# Patient Record
Sex: FEMALE | Race: WHITE
[De-identification: names, ages, dates, MRNs, and addresses within clinical notes are randomized per-mention and may not be internally consistent; named-entity substitution may affect disease eponyms.]

---

## 2017-01-24 ENCOUNTER — APPOINTMENT (OUTPATIENT)
Dept: NEUROLOGY | Facility: CLINIC | Age: 82
End: 2017-01-24

## 2017-01-24 VITALS — SYSTOLIC BLOOD PRESSURE: 114 MMHG | DIASTOLIC BLOOD PRESSURE: 70 MMHG | HEIGHT: 64 IN

## 2017-01-24 VITALS — WEIGHT: 108 LBS | BODY MASS INDEX: 18.54 KG/M2

## 2017-01-24 DIAGNOSIS — M35.9 SYSTEMIC INVOLVEMENT OF CONNECTIVE TISSUE, UNSPECIFIED: ICD-10-CM

## 2017-01-24 RX ORDER — SIMVASTATIN 10 MG/1
10 TABLET, FILM COATED ORAL
Refills: 0 | Status: ACTIVE | COMMUNITY

## 2017-04-25 ENCOUNTER — APPOINTMENT (OUTPATIENT)
Dept: NEUROLOGY | Facility: CLINIC | Age: 82
End: 2017-04-25

## 2017-04-25 DIAGNOSIS — F03.90 UNSPECIFIED DEMENTIA W/OUT BEHAVIORAL DISTURBANCE: ICD-10-CM

## 2017-04-25 RX ORDER — HYDROXYCHLOROQUINE SULFATE 200 MG/1
200 TABLET, FILM COATED ORAL
Qty: 60 | Refills: 0 | Status: ACTIVE | COMMUNITY
Start: 2017-03-28

## 2017-04-25 RX ORDER — PREDNISONE 5 MG/1
5 TABLET ORAL
Qty: 60 | Refills: 0 | Status: COMPLETED | COMMUNITY
Start: 2017-03-20

## 2017-04-25 RX ORDER — FLUOCINOLONE ACETONIDE 0.11 MG/ML
0.01 OIL TOPICAL
Qty: 118 | Refills: 0 | Status: ACTIVE | COMMUNITY
Start: 2017-02-28

## 2017-06-19 ENCOUNTER — APPOINTMENT (OUTPATIENT)
Dept: NEUROLOGY | Facility: CLINIC | Age: 82
End: 2017-06-19

## 2017-06-19 VITALS — SYSTOLIC BLOOD PRESSURE: 128 MMHG | WEIGHT: 101 LBS | DIASTOLIC BLOOD PRESSURE: 76 MMHG | BODY MASS INDEX: 17.34 KG/M2

## 2017-06-19 DIAGNOSIS — F03.90 UNSPECIFIED DEMENTIA W/OUT BEHAVIORAL DISTURBANCE: ICD-10-CM

## 2018-04-02 ENCOUNTER — INPATIENT (INPATIENT)
Facility: HOSPITAL | Age: 83
LOS: 0 days | Discharge: HOME | End: 2018-04-02
Attending: INTERNAL MEDICINE | Admitting: INTERNAL MEDICINE
Payer: MEDICARE

## 2018-04-02 VITALS
HEART RATE: 90 BPM | OXYGEN SATURATION: 100 % | RESPIRATION RATE: 18 BRPM | SYSTOLIC BLOOD PRESSURE: 169 MMHG | DIASTOLIC BLOOD PRESSURE: 87 MMHG | TEMPERATURE: 98 F

## 2018-04-02 VITALS
DIASTOLIC BLOOD PRESSURE: 76 MMHG | WEIGHT: 110.01 LBS | HEART RATE: 98 BPM | TEMPERATURE: 98 F | RESPIRATION RATE: 16 BRPM | SYSTOLIC BLOOD PRESSURE: 170 MMHG | OXYGEN SATURATION: 96 %

## 2018-04-02 DIAGNOSIS — R07.82 INTERCOSTAL PAIN: ICD-10-CM

## 2018-04-02 DIAGNOSIS — E78.2 MIXED HYPERLIPIDEMIA: ICD-10-CM

## 2018-04-02 DIAGNOSIS — I10 ESSENTIAL (PRIMARY) HYPERTENSION: ICD-10-CM

## 2018-04-02 LAB
ADD ON TEST-SPECIMEN IN LAB: SIGNIFICANT CHANGE UP
ALBUMIN SERPL ELPH-MCNC: 3.3 G/DL — SIGNIFICANT CHANGE UP (ref 3.3–5)
ALP SERPL-CCNC: 70 U/L — SIGNIFICANT CHANGE UP (ref 40–120)
ALT FLD-CCNC: 11 U/L — LOW (ref 12–78)
ANION GAP SERPL CALC-SCNC: 10 MMOL/L — SIGNIFICANT CHANGE UP (ref 5–17)
APTT BLD: 29.6 SEC — SIGNIFICANT CHANGE UP (ref 27.5–37.4)
AST SERPL-CCNC: 21 U/L — SIGNIFICANT CHANGE UP (ref 15–37)
BASOPHILS # BLD AUTO: 0.15 K/UL — SIGNIFICANT CHANGE UP (ref 0–0.2)
BASOPHILS NFR BLD AUTO: 2 % — SIGNIFICANT CHANGE UP (ref 0–2)
BILIRUB SERPL-MCNC: 0.6 MG/DL — SIGNIFICANT CHANGE UP (ref 0.2–1.2)
BUN SERPL-MCNC: 15 MG/DL — SIGNIFICANT CHANGE UP (ref 7–23)
CALCIUM SERPL-MCNC: 9.3 MG/DL — SIGNIFICANT CHANGE UP (ref 8.5–10.1)
CHLORIDE SERPL-SCNC: 105 MMOL/L — SIGNIFICANT CHANGE UP (ref 96–108)
CO2 SERPL-SCNC: 23 MMOL/L — SIGNIFICANT CHANGE UP (ref 22–31)
CREAT SERPL-MCNC: 0.69 MG/DL — SIGNIFICANT CHANGE UP (ref 0.5–1.3)
D DIMER BLD IA.RAPID-MCNC: 161 NG/ML DDU — SIGNIFICANT CHANGE UP
EOSINOPHIL # BLD AUTO: 0.11 K/UL — SIGNIFICANT CHANGE UP (ref 0–0.5)
EOSINOPHIL NFR BLD AUTO: 1.5 % — SIGNIFICANT CHANGE UP (ref 0–6)
GLUCOSE SERPL-MCNC: 67 MG/DL — LOW (ref 70–99)
HCT VFR BLD CALC: 35.9 % — SIGNIFICANT CHANGE UP (ref 34.5–45)
HGB BLD-MCNC: 12.4 G/DL — SIGNIFICANT CHANGE UP (ref 11.5–15.5)
IMM GRANULOCYTES NFR BLD AUTO: 0.1 % — SIGNIFICANT CHANGE UP (ref 0–1.5)
INR BLD: 1.08 RATIO — SIGNIFICANT CHANGE UP (ref 0.88–1.16)
LYMPHOCYTES # BLD AUTO: 1.55 K/UL — SIGNIFICANT CHANGE UP (ref 1–3.3)
LYMPHOCYTES # BLD AUTO: 20.8 % — SIGNIFICANT CHANGE UP (ref 13–44)
MCHC RBC-ENTMCNC: 29.6 PG — SIGNIFICANT CHANGE UP (ref 27–34)
MCHC RBC-ENTMCNC: 34.5 GM/DL — SIGNIFICANT CHANGE UP (ref 32–36)
MCV RBC AUTO: 85.7 FL — SIGNIFICANT CHANGE UP (ref 80–100)
MONOCYTES # BLD AUTO: 1.17 K/UL — HIGH (ref 0–0.9)
MONOCYTES NFR BLD AUTO: 15.7 % — HIGH (ref 2–14)
NEUTROPHILS # BLD AUTO: 4.46 K/UL — SIGNIFICANT CHANGE UP (ref 1.8–7.4)
NEUTROPHILS NFR BLD AUTO: 59.9 % — SIGNIFICANT CHANGE UP (ref 43–77)
NRBC # BLD: 0 /100 WBCS — SIGNIFICANT CHANGE UP (ref 0–0)
NT-PROBNP SERPL-SCNC: 4518 PG/ML — HIGH (ref 0–450)
PLATELET # BLD AUTO: 370 K/UL — SIGNIFICANT CHANGE UP (ref 150–400)
POTASSIUM SERPL-MCNC: 3.9 MMOL/L — SIGNIFICANT CHANGE UP (ref 3.5–5.3)
POTASSIUM SERPL-SCNC: 3.9 MMOL/L — SIGNIFICANT CHANGE UP (ref 3.5–5.3)
PROT SERPL-MCNC: 7 GM/DL — SIGNIFICANT CHANGE UP (ref 6–8.3)
PROTHROM AB SERPL-ACNC: 11.7 SEC — SIGNIFICANT CHANGE UP (ref 9.8–12.7)
RBC # BLD: 4.19 M/UL — SIGNIFICANT CHANGE UP (ref 3.8–5.2)
RBC # FLD: 13.1 % — SIGNIFICANT CHANGE UP (ref 10.3–14.5)
SODIUM SERPL-SCNC: 138 MMOL/L — SIGNIFICANT CHANGE UP (ref 135–145)
TROPONIN I SERPL-MCNC: <0.015 NG/ML — SIGNIFICANT CHANGE UP (ref 0.01–0.04)
TROPONIN I SERPL-MCNC: <0.015 NG/ML — SIGNIFICANT CHANGE UP (ref 0.01–0.04)
WBC # BLD: 7.45 K/UL — SIGNIFICANT CHANGE UP (ref 3.8–10.5)
WBC # FLD AUTO: 7.45 K/UL — SIGNIFICANT CHANGE UP (ref 3.8–10.5)

## 2018-04-02 PROCEDURE — 71045 X-RAY EXAM CHEST 1 VIEW: CPT | Mod: 26

## 2018-04-02 PROCEDURE — 93010 ELECTROCARDIOGRAM REPORT: CPT

## 2018-04-02 PROCEDURE — 99285 EMERGENCY DEPT VISIT HI MDM: CPT

## 2018-04-02 RX ORDER — ASPIRIN/CALCIUM CARB/MAGNESIUM 324 MG
325 TABLET ORAL ONCE
Qty: 0 | Refills: 0 | Status: COMPLETED | OUTPATIENT
Start: 2018-04-02 | End: 2018-04-02

## 2018-04-02 RX ORDER — SODIUM CHLORIDE 9 MG/ML
3 INJECTION INTRAMUSCULAR; INTRAVENOUS; SUBCUTANEOUS ONCE
Qty: 0 | Refills: 0 | Status: COMPLETED | OUTPATIENT
Start: 2018-04-02 | End: 2018-04-02

## 2018-04-02 RX ORDER — NITROGLYCERIN 6.5 MG
0.4 CAPSULE, EXTENDED RELEASE ORAL
Qty: 0 | Refills: 0 | Status: DISCONTINUED | OUTPATIENT
Start: 2018-04-02 | End: 2018-04-02

## 2018-04-02 RX ORDER — ACETAMINOPHEN 500 MG
650 TABLET ORAL EVERY 6 HOURS
Qty: 0 | Refills: 0 | Status: DISCONTINUED | OUTPATIENT
Start: 2018-04-02 | End: 2018-04-02

## 2018-04-02 RX ORDER — HYDROXYCHLOROQUINE SULFATE 200 MG
1 TABLET ORAL
Qty: 0 | Refills: 0 | COMMUNITY

## 2018-04-02 RX ORDER — ONDANSETRON 8 MG/1
4 TABLET, FILM COATED ORAL EVERY 6 HOURS
Qty: 0 | Refills: 0 | Status: DISCONTINUED | OUTPATIENT
Start: 2018-04-02 | End: 2018-04-02

## 2018-04-02 RX ORDER — ASPIRIN/CALCIUM CARB/MAGNESIUM 324 MG
325 TABLET ORAL DAILY
Qty: 0 | Refills: 0 | Status: DISCONTINUED | OUTPATIENT
Start: 2018-04-02 | End: 2018-04-02

## 2018-04-02 RX ORDER — HYDROXYCHLOROQUINE SULFATE 200 MG
200 TABLET ORAL DAILY
Qty: 0 | Refills: 0 | Status: DISCONTINUED | OUTPATIENT
Start: 2018-04-02 | End: 2018-04-02

## 2018-04-02 RX ADMIN — Medication 325 MILLIGRAM(S): at 13:54

## 2018-04-02 RX ADMIN — SODIUM CHLORIDE 3 MILLILITER(S): 9 INJECTION INTRAMUSCULAR; INTRAVENOUS; SUBCUTANEOUS at 11:37

## 2018-04-02 NOTE — ED ADULT NURSE REASSESSMENT NOTE - NS ED NURSE REASSESS COMMENT FT1
pt son refused straight cath for urine, pt states she does not have to urinate, pt discharged in no acute distress

## 2018-04-02 NOTE — ED ADULT NURSE NOTE - OBJECTIVE STATEMENT
pt presents to ED from home, pt alert and orientedx2, VSS afebrile, as per EMS patients  states that she had an epsiode of chest pain this morning. pt denies pain at this time, denies SOB, pt resting comfortably.  states pt has been fatigued altely as well, safety maintained will continue to monitor

## 2018-04-02 NOTE — CONSULT NOTE ADULT - PROBLEM SELECTOR RECOMMENDATION 9
- not typical of angina  - likely musculoskeletal in nature  - d/c home for outpatient ischemic workup

## 2018-04-02 NOTE — CONSULT NOTE ADULT - SUBJECTIVE AND OBJECTIVE BOX
86 F with Hx of HTN, Hyperlipidemia (not on any meds) presents with the sudden onset of chest pain while at rest while laying next to her .  Patient is unable to give any detailed information.  Information obtained from discussion with the  and son (Benny) at the bedside.  The chest pain was in the left chest and lasted just a few seconds and went away on its own.  No prior Hx of chest pain symptoms.  No exertional chest pain or shortness of breath.  No fall, no trauma and no recent heavy lifting.  No radiation of the pain to the left arm, jaw, or back and no associated diaphoresis, nausea, vomiting, or abdominal pain.  No associated pleuritic chest pain.      In the ED, the patient was treated with ASA.  First troponin was negative.  EKG did not show any acute ischemic changes.  No recurrent pain in the ED.  Tele in the ED showed no acute arrythmias.    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       VITALS:  T(F): 97.8 (04-02-18 @ 11:02), Max: 97.8 (04-02-18 @ 11:02)  HR: 98 (04-02-18 @ 11:02) (98 - 98)  BP: 170/76 (04-02-18 @ 11:02) (170/76 - 170/76)  RR: 16 (04-02-18 @ 11:02) (16 - 16)  SpO2: 96% (04-02-18 @ 11:02) (96% - 96%)   PHYSICAL EXAM:   HEENT:  pupils equal and reactive, EOMI, no oropharyngeal lesions, erythema, exudates, oral thrush   NECK:   supple, no carotid bruits, no palpable lymph nodes, no thyromegaly   CV:  +S1, +S2, regular, no murmurs or rubs   RESP:   lungs clear to auscultation bilaterally, no wheezing, rales, rhonchi, good air entry bilaterally   BREAST:  not examined   GI:  abdomen soft, non-tender, non-distended, normal BS, no bruits, no abdominal masses, no palpable masses   RECTAL:  not examined   :  not examined   MSK:   normal muscle tone, no atrophy, no rigidity, no contractions   EXT:   no clubbing, no cyanosis, no edema, no calf pain, swelling or erythema   VASCULAR:  pulses equal and symmetric in the upper and lower extremities   NEURO:  AAOX3, no focal neurological deficits, follows all commands, able to move extremities spontaneously   SKIN:  no ulcers, lesions or rashes	  	       Labs and Results:  Labs, Radiology, Cardiology, and Other Results:               12.4   7.45  )-----------( 370      ( 02 Apr 2018 11:16 )             35.9    04-02   138  |  105  |  15  ----------------------------<  67<L>  3.9   |  23  |  0.69   Ca    9.3      02 Apr 2018 11:16   TPro  7.0  /  Alb  3.3  /  TBili  0.6  /  DBili  x   /  AST  21  /  ALT  11<L>  /  AlkPhos  70  04-02   CARDIAC MARKERS ( 02 Apr 2018 14:14 )  <0.015 ng/mL / x     / x     / x     / x      CARDIAC MARKERS ( 02 Apr 2018 11:16 )  <0.015 ng/mL / x     / x     / x     / x       LIVER FUNCTIONS - ( 02 Apr 2018 11:16 )  Alb: 3.3 g/dL / Pro: 7.0 gm/dL / ALK PHOS: 70 U/L / ALT: 11 U/L / AST: 21 U/L / GGT: x          PT/INR - ( 02 Apr 2018 11:16 )   PT: 11.7 sec;   INR: 1.08 ratio     PTT - ( 02 Apr 2018 11:16 )  PTT:29.6 sec   D-DIMER =  negative   BNP - 4518   EKG - NSR, LVH with repolarization abnormalities, LAD, no acute ischemic changes, poor RW progression concerning for old anterior wall MI, no changes compared to prior EKGs   CXR -   EXAM:  XR CHEST AP OR PA 1V                         PROCEDURE DATE:  04/02/2018     INTERPRETATION:  XR CHEST AP OR PA 1V   Single AP view   HISTORY:  Chest Pain   Comparison:  none.   The cardiac silhouette is within normal limits. The lungs are clear. No   pleural abnormality.   IMPRESSION: No acute disease.	  	      IMPRESSION:    ATYPICAL CHEST PAIN IN PATIENT WITH RISK FACTORS FOR CAD.  CHEST PAIN WAS SELF-LIMITED AND NO OBJECTIVE EVIDENCE AT THIS TIME FOR ACTIVE CARDIAC ISCHEMIA    HYPERTENSION - ON NO MEDS AT HOME    ELEVATED BNP WITHOUT ACTIVE EVIDENCE FOR CONGESTIVE HEAT FAILURE      RECOMMENDATIONS:  -  serial cardiac enzymes, R/O MI  -  check fasting lipid panel  -  start ASA  -  PRN NTG q5min  -  continue Plaquenil  -  DVT prophylaxis - venodynes  -  Cardio consult - Dr. Nobles  -  likely d/c home from ED if enzymes are negative with outpatient follow up with cardio for stress test and ECHO    d/w patient,  and son Benny at the bedside and d/w ED MD Dr. Payne 86 F with Hx of HTN, Hyperlipidemia (not on any meds) presents with the sudden onset of chest pain while at rest while laying next to her .  Patient is unable to give any detailed information.  Information obtained from discussion with the  and son (Benny) at the bedside.  The chest pain was in the left chest and lasted just a few seconds and went away on its own.  No prior Hx of chest pain symptoms.  No exertional chest pain or shortness of breath.  No fall, no trauma and no recent heavy lifting.  No radiation of the pain to the left arm, jaw, or back and no associated diaphoresis, nausea, vomiting, or abdominal pain.  No associated pleuritic chest pain.      In the ED, the patient was treated with ASA.  First troponin was negative.  EKG did not show any acute ischemic changes.  No recurrent pain in the ED.  Tele in the ED showed no acute arrythmias.    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       VITALS:  T(F): 97.8 (04-02-18 @ 11:02), Max: 97.8 (04-02-18 @ 11:02)  HR: 98 (04-02-18 @ 11:02) (98 - 98)  BP: 170/76 (04-02-18 @ 11:02) (170/76 - 170/76)  RR: 16 (04-02-18 @ 11:02) (16 - 16)  SpO2: 96% (04-02-18 @ 11:02) (96% - 96%)   PHYSICAL EXAM:   HEENT:  pupils equal and reactive, EOMI, no oropharyngeal lesions, erythema, exudates, oral thrush   NECK:   supple, no carotid bruits, no palpable lymph nodes, no thyromegaly   CV:  +S1, +S2, regular, no murmurs or rubs   RESP:   lungs clear to auscultation bilaterally, no wheezing, rales, rhonchi, good air entry bilaterally   BREAST:  not examined   GI:  abdomen soft, non-tender, non-distended, normal BS, no bruits, no abdominal masses, no palpable masses   RECTAL:  not examined   :  not examined   MSK:   normal muscle tone, no atrophy, no rigidity, no contractions   EXT:   no clubbing, no cyanosis, no edema, no calf pain, swelling or erythema   VASCULAR:  pulses equal and symmetric in the upper and lower extremities   NEURO:  AAOX3, no focal neurological deficits, follows all commands, able to move extremities spontaneously   SKIN:  no ulcers, lesions or rashes	  	       Labs and Results:  Labs, Radiology, Cardiology, and Other Results:               12.4   7.45  )-----------( 370      ( 02 Apr 2018 11:16 )             35.9    04-02   138  |  105  |  15  ----------------------------<  67<L>  3.9   |  23  |  0.69   Ca    9.3      02 Apr 2018 11:16   TPro  7.0  /  Alb  3.3  /  TBili  0.6  /  DBili  x   /  AST  21  /  ALT  11<L>  /  AlkPhos  70  04-02   CARDIAC MARKERS ( 02 Apr 2018 14:14 )  <0.015 ng/mL / x     / x     / x     / x      CARDIAC MARKERS ( 02 Apr 2018 11:16 )  <0.015 ng/mL / x     / x     / x     / x       LIVER FUNCTIONS - ( 02 Apr 2018 11:16 )  Alb: 3.3 g/dL / Pro: 7.0 gm/dL / ALK PHOS: 70 U/L / ALT: 11 U/L / AST: 21 U/L / GGT: x          PT/INR - ( 02 Apr 2018 11:16 )   PT: 11.7 sec;   INR: 1.08 ratio     PTT - ( 02 Apr 2018 11:16 )  PTT:29.6 sec   D-DIMER =  negative   BNP - 4518   EKG - NSR, LVH with repolarization abnormalities, LAD, no acute ischemic changes, poor RW progression concerning for old anterior wall MI, no changes compared to prior EKGs   CXR -   EXAM:  XR CHEST AP OR PA 1V                         PROCEDURE DATE:  04/02/2018     INTERPRETATION:  XR CHEST AP OR PA 1V   Single AP view   HISTORY:  Chest Pain   Comparison:  none.   The cardiac silhouette is within normal limits. The lungs are clear. No   pleural abnormality.   IMPRESSION: No acute disease.	  	      IMPRESSION:    ATYPICAL CHEST PAIN IN PATIENT WITH RISK FACTORS FOR CAD.  CHEST PAIN WAS SELF-LIMITED AND NO OBJECTIVE EVIDENCE AT THIS TIME FOR ACTIVE CARDIAC ISCHEMIA.    HYPERTENSION - ON NO MEDS AT HOME    ELEVATED BNP WITHOUT ACTIVE EVIDENCE FOR CONGESTIVE HEAT FAILURE      RECOMMENDATIONS:  -  serial cardiac enzymes, R/O MI  -  check fasting lipid panel  -  start ASA  -  PRN NTG q5min  -  continue Plaquenil  -  DVT prophylaxis - venodynes  -  Cardio consult - Dr. Nobles  -  likely d/c home from ED if enzymes are negative with outpatient follow up with cardio for stress test and ECHO    d/w patient,  and son Benny at the bedside and d/w ED MD Dr. Payne

## 2018-04-02 NOTE — ED PROVIDER NOTE - CONSTITUTIONAL, MLM
normal... Well appearing, well nourished, awake, alert, and in no apparent distress. Well appearing, well nourished, awake, alert and in no apparent distress.

## 2018-04-02 NOTE — CONSULT NOTE ADULT - ASSESSMENT
Chest pain- atypical in nature- She underwent cardiac PET 1/2017 which did not reveal any ischemia.  Her LVEF was noted to be normal.  Pt denies any more symptoms.  Chest pain is atypical in nature. So far cardiac enzymes and EKG did not reveal any ischemia.   Outpt ischemic heart disease evaluation recommended.  f/u with cardiologist as outpt.   D/W Dr Marie and the family.   ASA 81mg po daily.   Consider statin but will defer this to Dr Nobles her primary  cardiologist.     Lupus- Management pre primary team.   Other medical issues- Management per primary team.   Thank you for allowing me to participate in the care of this patient. Please feel free to contact me with any questions.

## 2018-04-02 NOTE — ED PROVIDER NOTE - PROGRESS NOTE DETAILS
[facility] PGY4: ingrid Zapata Record [phone] Attending Kerry: Pt seen by cardiology consultm, rec since 2nd troponin negative and atypical CP, pt stable for DC home. Family would not like intervention even if recommended. Medical management discussed by cardiologist Attending Kerry: Pt seen by cardiology consult, rec since 2nd troponin negative and atypical CP, pt stable for DC home. Family would not like intervention even if recommended. Medical management discussed by cardiologist

## 2018-04-02 NOTE — ED PROVIDER NOTE - CARDIAC, MLM
Normal rate, regular rhythm.  Heart sounds S1, S2.  No murmurs, rubs or gallops. Normal rate, regular rhythm. Systolic murmur.

## 2018-04-02 NOTE — ED PROVIDER NOTE - OBJECTIVE STATEMENT
87 y/o F with a PMHx of HTN, dementia, HLD presents to the ED regarding an episode of acute onset CP today. At 10:00am today, pt had acute onset, sharp, severe CP that lasted a few seconds. Pain is non-radiating and localized to the chest. During episode, pt denies diaphoresis, palpitations. Pt has no prior episodes of this type of pain. No recent travel, sickness, fever, chills, nausea, vomiting, dyspnea. No h/o MI or CVA. Can't identify where it is. Pt's  spoke to their son who is a nurse that advised pt to come to ED. Pt is a poor historian secondary to pt's dementia. Cardio: Dr. Nobles. 87 y/o F with a PMHx of lupus, HTN, dementia, HLD presents to the ED regarding an episode of acute onset CP today. At 10:00am today, pt had acute onset, sharp, severe CP that lasted a few seconds. Pain is non-radiating and localized to the chest. During episode, pt denies diaphoresis, palpitations, vomiting. Pt has no prior episodes of this type of pain. Pt reports feeling tired. +incontinent at baseline. No SOB, recent travel, fever, chills, nausea, vomiting, dyspnea. No h/o MI or CVA. Pt has difficulty moving Pt's  spoke to their son who is a nurse that advised pt to come to ED. No anti-coagulants. Pt is a poor historian secondary to pt's dementia. History obtained from  at bedside. Cardio: Dr. Nobles.

## 2018-04-02 NOTE — H&P ADULT - NSHPPHYSICALEXAM_GEN_ALL_CORE
VITALS:  T(F): 97.8 (04-02-18 @ 11:02), Max: 97.8 (04-02-18 @ 11:02)  HR: 98 (04-02-18 @ 11:02) (98 - 98)  BP: 170/76 (04-02-18 @ 11:02) (170/76 - 170/76)  RR: 16 (04-02-18 @ 11:02) (16 - 16)  SpO2: 96% (04-02-18 @ 11:02) (96% - 96%)      PHYSICAL EXAM:    HEENT:  pupils equal and reactive, EOMI, no oropharyngeal lesions, erythema, exudates, oral thrush    NECK:   supple, no carotid bruits, no palpable lymph nodes, no thyromegaly    CV:  +S1, +S2, regular, no murmurs or rubs    RESP:   lungs clear to auscultation bilaterally, no wheezing, rales, rhonchi, good air entry bilaterally    BREAST:  not examined    GI:  abdomen soft, non-tender, non-distended, normal BS, no bruits, no abdominal masses, no palpable masses    RECTAL:  not examined    :  not examined    MSK:   normal muscle tone, no atrophy, no rigidity, no contractions    EXT:   no clubbing, no cyanosis, no edema, no calf pain, swelling or erythema    VASCULAR:  pulses equal and symmetric in the upper and lower extremities    NEURO:  AAOX3, no focal neurological deficits, follows all commands, able to move extremities spontaneously    SKIN:  no ulcers, lesions or rashes

## 2018-04-02 NOTE — CONSULT NOTE ADULT - SUBJECTIVE AND OBJECTIVE BOX
CARDIOLOGY AND ELECTROPHYSIOLOGY ASSESSMENT    HPI:  86 F with Hx of HTN, Hyperlipidemia (not on any meds) presents with the sudden onset of chest pain while at rest while laying next to her .  Patient is unable to give any detailed information.  Information obtained from discussion with the  and son (Benny) at the bedside.  The chest pain was in the left chest and lasted just a few seconds and went away on its own.  No prior Hx of chest pain symptoms.  No exertional chest pain or shortness of breath.  No fall, no trauma and no recent heavy lifting.  No radiation of the pain to the left arm, jaw, or back and no associated diaphoresis, nausea, vomiting, or abdominal pain.  No associated pleuritic chest pain.      In the ED, the patient was treated with ASA.  First two sets of  troponin were negative.  EKG did not show any acute ischemic changes.  No recurrent pain in the ED.  Tele in the ED showed no acute arrythmias.    PAST MEDICAL HISTORY:    Lupus - on Placquenil  HTN - stopped taking meds  Hyperlipidemia  Celiac Disease  Hx of TIA  Hx of Syncope  Hx of CAD  Dementia - no longer on meds    PAST SURGICAL HISTORY:  s/p hysterectomy  s/p vulvar resection and reconstructive surgery for cancer  s/p resection Squamous cell CA of the leg    FAMILY HISTORY:   non-contributory to the patient's current presentation (02 Apr 2018 15:04)      PAST MEDICAL & SURGICAL HISTORY:  Lupus  HLD (hyperlipidemia)  HTN (hypertension)  Dementia  No significant past surgical history      MEDICATIONS  (STANDING):  aspirin 325 milliGRAM(s) Oral daily  hydroxychloroquine 200 milliGRAM(s) Oral daily    MEDICATIONS  (PRN):  acetaminophen   Tablet 650 milliGRAM(s) Oral every 6 hours PRN For Temp greater than 38 C (100.4 F)  nitroglycerin     SubLingual 0.4 milliGRAM(s) SubLingual every 5 minutes PRN Chest Pain  ondansetron Injectable 4 milliGRAM(s) IV Push every 6 hours PRN Nausea and/or Vomiting      Allergies    No Known Allergies    Intolerances        SOCIAL HISTORY: Denies tobacco, etoh abuse or illicit drug use    FAMILY HISTORY:      Vital Signs Last 24 Hrs  T(C): 36.9 (02 Apr 2018 15:23), Max: 36.9 (02 Apr 2018 15:23)  T(F): 98.5 (02 Apr 2018 15:23), Max: 98.5 (02 Apr 2018 15:23)  HR: 90 (02 Apr 2018 15:23) (90 - 98)  BP: 169/87 (02 Apr 2018 15:23) (169/87 - 170/76)  BP(mean): 105 (02 Apr 2018 15:23) (105 - 105)  RR: 18 (02 Apr 2018 15:23) (16 - 18)  SpO2: 100% (02 Apr 2018 15:23) (96% - 100%)    REVIEW OF SYSTEMS:    CONSTITUTIONAL:  As per HPI.  HEENT:  Eyes:  No diplopia or blurred vision. ENT:  No earache, sore throat or runny nose.  CARDIOVASCULAR:  No pressure, squeezing, strangling, tightness, heaviness or aching about the chest, neck, axilla or epigastrium.  RESPIRATORY:  No cough, shortness of breath, PND or orthopnea.  GASTROINTESTINAL:  No nausea, vomiting or diarrhea.  GENITOURINARY:  No dysuria, frequency or urgency.  MUSCULOSKELETAL:  As per HPI.  SKIN:  No change in skin, hair or nails.  NEUROLOGIC:  No paresthesias, fasciculations, seizures or weakness.  PSYCHIATRIC:  No disorder of thought or mood.  ENDOCRINE:  No heat or cold intolerance, polyuria or polydipsia.  HEMATOLOGICAL:  No easy bruising or bleedings:  .     PHYSICAL EXAMINATION:    GENERAL APPEARANCE:  Pt. is not currently dyspneic, in no distress. Pt. is alert, oriented, and pleasant.  HEENT:  Pupils are normal and react normally. No icterus. Mucous membranes well colored.  NECK:  Supple. No lymphadenopathy. Jugular venous pressure not elevated. Carotids equal.   HEART:   The cardiac impulse has a normal quality. There are no murmurs, rubs or gallops noted  CHEST:  Chest is clear to auscultation. Normal respiratory effort.  ABDOMEN:  Soft and nontender.   EXTREMITIES:  There is no edema.   SKIN:  No rash or significant lesions are noted.    I&O's Summary      LABS:                        12.4   7.45  )-----------( 370      ( 02 Apr 2018 11:16 )             35.9   04-02    138  |  105  |  15  ----------------------------<  67<L>  3.9   |  23  |  0.69    Ca    9.3      02 Apr 2018 11:16    TPro  7.0  /  Alb  3.3  /  TBili  0.6  /  DBili  x   /  AST  21  /  ALT  11<L>  /  AlkPhos  70  04-02  LIVER FUNCTIONS - ( 02 Apr 2018 11:16 )  Alb: 3.3 g/dL / Pro: 7.0 gm/dL / ALK PHOS: 70 U/L / ALT: 11 U/L / AST: 21 U/L / GGT: x           PT/INR - ( 02 Apr 2018 11:16 )   PT: 11.7 sec;   INR: 1.08 ratio         PTT - ( 02 Apr 2018 11:16 )  PTT:29.6 secCARDIAC MARKERS ( 02 Apr 2018 14:14 )  <0.015 ng/mL / x     / x     / x     / x      CARDIAC MARKERS ( 02 Apr 2018 11:16 )  <0.015 ng/mL / x     / x     / x     / x

## 2018-04-02 NOTE — H&P ADULT - HISTORY OF PRESENT ILLNESS
86 F with Hx of HTN, Hyperlipidemia (not on any meds) presents with the sudden onset of chest pain while at rest while laying next to her .  Patient is unable to give any detailed information.  Information obtained from discussion with the  and son (Benny) at the bedside.  The chest pain was in the left chest and lasted just a few seconds and went away on its own.  No prior Hx of chest pain symptoms.  No exertional chest pain or shortness of breath.  No fall, no trauma and no recent heavy lifting.  No radiation of the pain to the left arm, jaw, or back and no associated diaphoresis, nausea, vomiting, or abdominal pain.  No associated pleuritic chest pain.      In the ED, the patient was treated with ASA.  First troponin was negative.  EKG did not show any acute ischemic changes.  No recurrent pain in the ED.  Tele in the ED showed no acute arrythmias.    PAST MEDICAL HISTORY:    Lupus - on Placquenil  HTN - stopped taking meds  Hyperlipidemia  Celiac Disease  Hx of TIA  Hx of Syncope  Hx of CAD  Dementia - no longer on meds    PAST SURGICAL HISTORY:  s/p hysterectomy  s/p vulvar resection and reconstructive surgery for cancer  s/p resection Squamous cell CA of the leg    FAMILY HISTORY:   non-contributory to the patient's current presentation

## 2018-04-02 NOTE — ED PROVIDER NOTE - ATTENDING CONTRIBUTION TO CARE
I, Jose Payne MD, personally saw the patient with resident.  I have personally performed a face to face diagnostic evaluation on this patient.  I have reviewed the resident note and agree with the history, exam, and plan of care, except as noted.

## 2018-04-02 NOTE — CONSULT NOTE ADULT - SUBJECTIVE AND OBJECTIVE BOX
Patient is a 86y old  Female who presents with a chief complaint of chest pain.      HPI:  86 F with Hx of HTN, Hyperlipidemia, prior nonobstructive CAD presented with c/o chest pain.  She was in bed this am and rolled in bed and c/ o chest pain per  which lasted for a very brief period.  No radiation of the pain.   No SOB or dizziness.   and son at bedside providing history.  Pt has baseline dementia and was unable to recollect any details.        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     PAST MEDICAL & SURGICAL HISTORY:  Lupus  HLD (hyperlipidemia)  HTN (hypertension)  Dementia  No significant past surgical history      MEDICATIONS  (STANDING):  aspirin 325 milliGRAM(s) Oral daily  hydroxychloroquine 200 milliGRAM(s) Oral daily    MEDICATIONS  (PRN):  acetaminophen   Tablet 650 milliGRAM(s) Oral every 6 hours PRN For Temp greater than 38 C (100.4 F)  nitroglycerin     SubLingual 0.4 milliGRAM(s) SubLingual every 5 minutes PRN Chest Pain  ondansetron Injectable 4 milliGRAM(s) IV Push every 6 hours PRN Nausea and/or Vomiting            SOCIAL HISTORY:  non smoker, no alcohol use     REVIEW OF SYSTEMS:  CONSTITUTIONAL:  No night sweats.  No fatigue, malaise, lethargy.  No fever or chills.  HEENT:  Eyes:  No visual changes.  No eye pain.      ENT:  No runny nose.  No epistaxis.  No sinus pain.  No sore throat.  No odynophagia.  No ear pain.  No congestion.  RESPIRATORY:  No cough.  No wheeze.  No hemoptysis.  No shortness of breath.  CARDIOVASCULAR:  c/o chest pains.  No palpitations. No shortness of breath, No orthopnea or PND.  GASTROINTESTINAL:  No abdominal pain.  No nausea or vomiting.  No diarrhea or constipation.  No hematemesis.  No hematochezia.  No melena.  GENITOURINARY:  No urgency.  No frequency.  No dysuria.  No hematuria.  No obstructive symptoms.  No discharge.  No pain.  No significant abnormal bleeding.  MUSCULOSKELETAL:  No musculoskeletal pain.  No joint swelling.  No arthritis.  NEUROLOGICAL:  No tingling or numbness or weakness.  PSYCHIATRIC:  No confusion  SKIN:  No rashes.  No lesions.  No wounds.  ENDOCRINE:  No unexplained weight loss.  No polydipsia.  No polyuria.  No polyphagia.  HEMATOLOGIC:  No anemia.  No purpura.  No petechiae.  No prolonged or excessive bleeding.   ALLERGIC AND IMMUNOLOGIC:  No pruritus.  No swelling.         Vital Signs Last 24 Hrs  T(C): 36.9 (02 Apr 2018 15:23), Max: 36.9 (02 Apr 2018 15:23)  T(F): 98.5 (02 Apr 2018 15:23), Max: 98.5 (02 Apr 2018 15:23)  HR: 90 (02 Apr 2018 15:23) (90 - 98)  BP: 169/87 (02 Apr 2018 15:23) (169/87 - 170/76)  BP(mean): 105 (02 Apr 2018 15:23) (105 - 105)  RR: 18 (02 Apr 2018 15:23) (16 - 18)  SpO2: 100% (02 Apr 2018 15:23) (96% - 100%)    PHYSICAL EXAM-    Constitutional: The patient appears to be normal, well developed, well nourished and alert and oriented to time, place and person. The patient does not appear acutely ill.     Head: Head is normocephalic and atraumatic.      Neck: The patient's neck is supple without enlargement, has no palpable thyromegaly nor thyroid nodules and has no jugular venous distention. No audible carotid bruits. There are strong carotid pulses bilaterally. No JVD.     Cardiovascular: Regular rate and rhythm without S3, S4. No murmurs or rubs are appreciated.      Respiratory: Breath sounds are normal. No rales. No wheezing.    Abdomen: Soft, nontender, nondistended with positive bowel sounds.      Extremity: No tenderness. No  pitting edema No skin discoloration No clubbing No cyanosis.     Neurologic: The patient is alert and oriented.      Skin: No rash, no obvious lesions noted.      Psychiatric: The patient appears to be emotionally stable.      INTERPRETATION OF TELEMETRY: sinus rythm    ECG: sinus rtyhm, L axis, poor R wave progression, unchanged from her EKG from 11/2/2009    I&O's Detail      LABS:                        12.4   7.45  )-----------( 370      ( 02 Apr 2018 11:16 )             35.9     04-02    138  |  105  |  15  ----------------------------<  67<L>  3.9   |  23  |  0.69    Ca    9.3      02 Apr 2018 11:16    TPro  7.0  /  Alb  3.3  /  TBili  0.6  /  DBili  x   /  AST  21  /  ALT  11<L>  /  AlkPhos  70  04-02    CARDIAC MARKERS ( 02 Apr 2018 14:14 )  <0.015 ng/mL / x     / x     / x     / x      CARDIAC MARKERS ( 02 Apr 2018 11:16 )  <0.015 ng/mL / x     / x     / x     / x          PT/INR - ( 02 Apr 2018 11:16 )   PT: 11.7 sec;   INR: 1.08 ratio         PTT - ( 02 Apr 2018 11:16 )  PTT:29.6 sec    I&O's Summary    BNPSerum Pro-Brain Natriuretic Peptide: 4518 pg/mL (04-02 @ 11:16)    RADIOLOGY & ADDITIONAL STUDIES:

## 2018-04-02 NOTE — H&P ADULT - ASSESSMENT
ATYPICAL CHEST PAIN IN PATIENT WITH RISK FACTORS FOR CAD.  CHEST PAIN WAS SELF-LIMITED AND NO OBJECTIVE EVIDENCE AT THIS TIME FOR ACTIVE CARDIAC ISCHEMIA    HYPERTENSION - ON NO MEDS AT HOME    ELEVATED BNP WITHOUT ACTIVE EVIDENCE FOR CONGESTIVE HEAT FAILURE      PLAN:  -  admit to inpatient, tele, hospitalist  -  serial cardiac enzymes, R/O MI  -  check fasting lipid panel  -  start ASA  -  PRN NTG q5min  -  continue Plaquenil  -  DVT prophylaxis - venodynes  -  Cardio consult - Dr. Nobles  -  likely d/c home in am with outpatient ECHO and stress test if rules out for MI    d/w patient,  and son Benny at the bedside

## 2018-04-02 NOTE — H&P ADULT - NSHPLABSRESULTS_GEN_ALL_CORE
12.4   7.45  )-----------( 370      ( 02 Apr 2018 11:16 )             35.9     04-02    138  |  105  |  15  ----------------------------<  67<L>  3.9   |  23  |  0.69    Ca    9.3      02 Apr 2018 11:16    TPro  7.0  /  Alb  3.3  /  TBili  0.6  /  DBili  x   /  AST  21  /  ALT  11<L>  /  AlkPhos  70  04-02    CARDIAC MARKERS ( 02 Apr 2018 14:14 )  <0.015 ng/mL / x     / x     / x     / x      CARDIAC MARKERS ( 02 Apr 2018 11:16 )  <0.015 ng/mL / x     / x     / x     / x        LIVER FUNCTIONS - ( 02 Apr 2018 11:16 )  Alb: 3.3 g/dL / Pro: 7.0 gm/dL / ALK PHOS: 70 U/L / ALT: 11 U/L / AST: 21 U/L / GGT: x           PT/INR - ( 02 Apr 2018 11:16 )   PT: 11.7 sec;   INR: 1.08 ratio      PTT - ( 02 Apr 2018 11:16 )  PTT:29.6 sec    D-DIMER =  negative    BNP - 4518    EKG - NSR, LVH with repolarization abnormalities, LAD, no acute ischemic changes, poor RW progression concerning for old anterior wall MI, no changes compared to prior EKGs    CXR -    EXAM:  XR CHEST AP OR PA 1V                          PROCEDURE DATE:  04/02/2018      INTERPRETATION:  XR CHEST AP OR PA 1V    Single AP view    HISTORY:  Chest Pain    Comparison:  none.    The cardiac silhouette is within normal limits. The lungs are clear. No   pleural abnormality.    IMPRESSION: No acute disease.

## 2018-04-10 DIAGNOSIS — F03.90 UNSPECIFIED DEMENTIA WITHOUT BEHAVIORAL DISTURBANCE: ICD-10-CM

## 2018-04-10 DIAGNOSIS — E78.5 HYPERLIPIDEMIA, UNSPECIFIED: ICD-10-CM

## 2018-04-10 DIAGNOSIS — R07.82 INTERCOSTAL PAIN: ICD-10-CM

## 2018-04-10 DIAGNOSIS — R07.89 OTHER CHEST PAIN: ICD-10-CM

## 2018-04-10 DIAGNOSIS — I10 ESSENTIAL (PRIMARY) HYPERTENSION: ICD-10-CM

## 2018-04-10 DIAGNOSIS — E78.2 MIXED HYPERLIPIDEMIA: ICD-10-CM

## 2018-04-10 DIAGNOSIS — I25.10 ATHEROSCLEROTIC HEART DISEASE OF NATIVE CORONARY ARTERY WITHOUT ANGINA PECTORIS: ICD-10-CM

## 2018-04-10 DIAGNOSIS — K90.0 CELIAC DISEASE: ICD-10-CM

## 2022-10-16 NOTE — ED ADULT NURSE NOTE - CAS EDN DISCHARGE ASSESSMENT
FAMILY HISTORY:  No pertinent family history in first degree relatives     No adverse reaction to first time med in ED/Alert and oriented to person, place and time

## 2025-04-20 NOTE — ED ADULT NURSE NOTE - GASTROINTESTINAL ASSESSMENT
--Ibuprofen 600 mg every 6 hours as needed for pain. Take with food.  --Hydrocodone/APAP: 1 tab every 6 hours as needed for pain. Take with food. OK to alternate with ibuprofen.  --Miralax or daily while you are taking hydrocodone. of magnesia for constipation.   --Milk of Magnesia as needed for constipation.   WDL